# Patient Record
Sex: FEMALE | Race: WHITE | Employment: UNEMPLOYED | URBAN - METROPOLITAN AREA
[De-identification: names, ages, dates, MRNs, and addresses within clinical notes are randomized per-mention and may not be internally consistent; named-entity substitution may affect disease eponyms.]

---

## 2017-12-28 ENCOUNTER — APPOINTMENT (OUTPATIENT)
Dept: GENERAL RADIOLOGY | Facility: HOSPITAL | Age: 47
DRG: 189 | End: 2017-12-28
Attending: EMERGENCY MEDICINE
Payer: MEDICARE

## 2017-12-28 ENCOUNTER — HOSPITAL ENCOUNTER (INPATIENT)
Facility: HOSPITAL | Age: 47
LOS: 3 days | Discharge: HOME HEALTH | DRG: 189 | End: 2018-01-01
Attending: EMERGENCY MEDICINE | Admitting: HOSPITALIST
Payer: MEDICARE

## 2017-12-28 DIAGNOSIS — E87.6 HYPOKALEMIA: ICD-10-CM

## 2017-12-28 DIAGNOSIS — T17.908A ASPIRATION OF FOREIGN BODY, INITIAL ENCOUNTER: Primary | ICD-10-CM

## 2017-12-28 LAB
ALBUMIN SERPL-MCNC: 3.7 G/DL (ref 3.5–4.8)
ALP LIVER SERPL-CCNC: 86 U/L (ref 39–100)
ALT SERPL-CCNC: 141 U/L (ref 14–54)
AST SERPL-CCNC: 251 U/L (ref 15–41)
BASOPHILS # BLD AUTO: 0.02 X10(3) UL (ref 0–0.1)
BASOPHILS NFR BLD AUTO: 0.7 %
BILIRUB SERPL-MCNC: 0.3 MG/DL (ref 0.1–2)
BUN BLD-MCNC: 8 MG/DL (ref 8–20)
CALCIUM BLD-MCNC: 8.5 MG/DL (ref 8.3–10.3)
CHLORIDE: 104 MMOL/L (ref 101–111)
CK: 345 IU/L (ref 26–192)
CKMB: 5 NG/ML (ref 0–5)
CO2: 25 MMOL/L (ref 22–32)
CREAT BLD-MCNC: 0.8 MG/DL (ref 0.55–1.02)
EOSINOPHIL # BLD AUTO: 0.14 X10(3) UL (ref 0–0.3)
EOSINOPHIL NFR BLD AUTO: 4.8 %
ERYTHROCYTE [DISTWIDTH] IN BLOOD BY AUTOMATED COUNT: 12.2 % (ref 11.5–16)
GLUCOSE BLD-MCNC: 135 MG/DL (ref 70–99)
HAV IGM SER QL: 2.1 MG/DL (ref 1.7–3)
HCT VFR BLD AUTO: 38.2 % (ref 34–50)
HGB BLD-MCNC: 13.2 G/DL (ref 12–16)
IMMATURE GRANULOCYTE COUNT: 0.02 X10(3) UL (ref 0–1)
IMMATURE GRANULOCYTE RATIO %: 0.7 %
LYMPHOCYTES # BLD AUTO: 0.96 X10(3) UL (ref 0.9–4)
LYMPHOCYTES NFR BLD AUTO: 32.9 %
M PROTEIN MFR SERPL ELPH: 7.8 G/DL (ref 6.1–8.3)
MB INDEX: 1 % (ref ?–4)
MCH RBC QN AUTO: 30.6 PG (ref 27–33.2)
MCHC RBC AUTO-ENTMCNC: 34.6 G/DL (ref 31–37)
MCV RBC AUTO: 88.6 FL (ref 81–100)
MONOCYTES # BLD AUTO: 0.22 X10(3) UL (ref 0.1–0.6)
MONOCYTES NFR BLD AUTO: 7.5 %
NEUTROPHIL ABS PRELIM: 1.56 X10 (3) UL (ref 1.3–6.7)
NEUTROPHILS # BLD AUTO: 1.56 X10(3) UL (ref 1.3–6.7)
NEUTROPHILS NFR BLD AUTO: 53.4 %
PLATELET # BLD AUTO: 204 10(3)UL (ref 150–450)
POTASSIUM SERPL-SCNC: 2.8 MMOL/L (ref 3.6–5.1)
RBC # BLD AUTO: 4.31 X10(6)UL (ref 3.8–5.1)
RED CELL DISTRIBUTION WIDTH-SD: 40.4 FL (ref 35.1–46.3)
SODIUM SERPL-SCNC: 138 MMOL/L (ref 136–144)
WBC # BLD AUTO: 2.9 X10(3) UL (ref 4–13)

## 2017-12-28 PROCEDURE — 71010 XR CHEST AP PORTABLE  (CPT=71010): CPT | Performed by: EMERGENCY MEDICINE

## 2017-12-28 PROCEDURE — 70360 X-RAY EXAM OF NECK: CPT | Performed by: EMERGENCY MEDICINE

## 2017-12-28 PROCEDURE — 99223 1ST HOSP IP/OBS HIGH 75: CPT | Performed by: HOSPITALIST

## 2017-12-28 RX ORDER — ZOLPIDEM TARTRATE 10 MG/1
10 TABLET ORAL NIGHTLY PRN
COMMUNITY

## 2017-12-28 RX ORDER — SODIUM CHLORIDE 9 MG/ML
125 INJECTION, SOLUTION INTRAVENOUS CONTINUOUS
Status: DISCONTINUED | OUTPATIENT
Start: 2017-12-28 | End: 2017-12-28

## 2017-12-28 RX ORDER — HYDROMORPHONE HYDROCHLORIDE 1 MG/ML
0.5 INJECTION, SOLUTION INTRAMUSCULAR; INTRAVENOUS; SUBCUTANEOUS
Status: DISCONTINUED | OUTPATIENT
Start: 2017-12-28 | End: 2017-12-29

## 2017-12-28 RX ORDER — LEVOTHYROXINE SODIUM 88 UG/1
88 TABLET ORAL
COMMUNITY

## 2017-12-28 RX ORDER — HYDROCODONE BITARTRATE AND ACETAMINOPHEN 10; 325 MG/1; MG/1
1 TABLET ORAL EVERY 6 HOURS PRN
COMMUNITY

## 2017-12-28 RX ORDER — SODIUM CHLORIDE 9 MG/ML
INJECTION, SOLUTION INTRAVENOUS CONTINUOUS
Status: ACTIVE | OUTPATIENT
Start: 2017-12-28 | End: 2017-12-28

## 2017-12-28 RX ORDER — ONDANSETRON 2 MG/ML
4 INJECTION INTRAMUSCULAR; INTRAVENOUS EVERY 4 HOURS PRN
Status: DISCONTINUED | OUTPATIENT
Start: 2017-12-28 | End: 2017-12-28

## 2017-12-28 RX ORDER — FLUOXETINE HYDROCHLORIDE 20 MG/5ML
LIQUID ORAL DAILY
COMMUNITY

## 2017-12-28 RX ORDER — SODIUM CHLORIDE 9 MG/ML
INJECTION, SOLUTION INTRAVENOUS CONTINUOUS
Status: DISCONTINUED | OUTPATIENT
Start: 2017-12-28 | End: 2017-12-29

## 2017-12-28 RX ORDER — KETOROLAC TROMETHAMINE 30 MG/ML
15 INJECTION, SOLUTION INTRAMUSCULAR; INTRAVENOUS EVERY 6 HOURS PRN
Status: DISCONTINUED | OUTPATIENT
Start: 2017-12-28 | End: 2017-12-28

## 2017-12-28 RX ORDER — LORAZEPAM 2 MG/ML
0.5 INJECTION INTRAMUSCULAR EVERY 6 HOURS PRN
Status: DISCONTINUED | OUTPATIENT
Start: 2017-12-28 | End: 2017-12-29

## 2017-12-28 RX ORDER — KETOROLAC TROMETHAMINE 30 MG/ML
30 INJECTION, SOLUTION INTRAMUSCULAR; INTRAVENOUS EVERY 6 HOURS PRN
Status: DISCONTINUED | OUTPATIENT
Start: 2017-12-28 | End: 2017-12-28

## 2017-12-28 RX ORDER — ONDANSETRON 2 MG/ML
4 INJECTION INTRAMUSCULAR; INTRAVENOUS EVERY 6 HOURS PRN
Status: DISCONTINUED | OUTPATIENT
Start: 2017-12-28 | End: 2018-01-01

## 2017-12-28 RX ORDER — CLONAZEPAM 1 MG/1
1 TABLET ORAL 3 TIMES DAILY PRN
COMMUNITY

## 2017-12-29 ENCOUNTER — APPOINTMENT (OUTPATIENT)
Dept: CT IMAGING | Facility: HOSPITAL | Age: 47
DRG: 189 | End: 2017-12-29
Attending: NURSE PRACTITIONER
Payer: MEDICARE

## 2017-12-29 ENCOUNTER — APPOINTMENT (OUTPATIENT)
Dept: GENERAL RADIOLOGY | Facility: HOSPITAL | Age: 47
DRG: 189 | End: 2017-12-29
Attending: INTERNAL MEDICINE
Payer: MEDICARE

## 2017-12-29 PROBLEM — J96.01 ACUTE RESPIRATORY FAILURE WITH HYPOXEMIA (HCC): Status: ACTIVE | Noted: 2017-12-29

## 2017-12-29 LAB
ALBUMIN SERPL-MCNC: 3.2 G/DL (ref 3.5–4.8)
ALLENS TEST: POSITIVE
ALP LIVER SERPL-CCNC: 74 U/L (ref 39–100)
ALT SERPL-CCNC: 129 U/L (ref 14–54)
ARTERIAL BLD GAS O2 SATURATION: 98 % (ref 92–100)
ARTERIAL BLOOD GAS BASE EXCESS: -1.3
ARTERIAL BLOOD GAS HCO3: 23.7 MEQ/L (ref 22–26)
ARTERIAL BLOOD GAS PCO2: 41 MM HG (ref 35–45)
ARTERIAL BLOOD GAS PH: 7.38 (ref 7.35–7.45)
ARTERIAL BLOOD GAS PO2: 263 MM HG (ref 80–105)
AST SERPL-CCNC: 191 U/L (ref 15–41)
ATRIAL RATE: 116 BPM
ATRIAL RATE: 95 BPM
BASOPHILS # BLD AUTO: 0.02 X10(3) UL (ref 0–0.1)
BASOPHILS NFR BLD AUTO: 0.2 %
BILIRUB SERPL-MCNC: 0.4 MG/DL (ref 0.1–2)
BUN BLD-MCNC: 7 MG/DL (ref 8–20)
CALCIUM BLD-MCNC: 8.1 MG/DL (ref 8.3–10.3)
CALCULATED O2 SATURATION: 100 % (ref 92–100)
CARBOXYHEMOGLOBIN: 0.7 % SAT (ref 0–3)
CHLORIDE: 108 MMOL/L (ref 101–111)
CO2: 23 MMOL/L (ref 22–32)
CREAT BLD-MCNC: 0.62 MG/DL (ref 0.55–1.02)
D-DIMER: 0.86 UG/ML FEU (ref 0–0.49)
EOSINOPHIL # BLD AUTO: 0.03 X10(3) UL (ref 0–0.3)
EOSINOPHIL NFR BLD AUTO: 0.3 %
ERYTHROCYTE [DISTWIDTH] IN BLOOD BY AUTOMATED COUNT: 12.3 % (ref 11.5–16)
GLUCOSE BLD-MCNC: 123 MG/DL (ref 70–99)
HAV IGM SER QL: NONREACTIVE
HBV CORE IGM SER QL: NONREACTIVE
HBV SURFACE AG SERPL QL IA: NONREACTIVE
HCT VFR BLD AUTO: 37.6 % (ref 34–50)
HEPATITIS C VIRUS AB INTERPRETATION: NONREACTIVE
HGB BLD-MCNC: 12.6 G/DL (ref 12–16)
IMMATURE GRANULOCYTE COUNT: 0.03 X10(3) UL (ref 0–1)
IMMATURE GRANULOCYTE RATIO %: 0.3 %
L/M: 15 L/MIN
LYMPHOCYTES # BLD AUTO: 0.43 X10(3) UL (ref 0.9–4)
LYMPHOCYTES NFR BLD AUTO: 4.7 %
M PROTEIN MFR SERPL ELPH: 6.8 G/DL (ref 6.1–8.3)
MCH RBC QN AUTO: 30.4 PG (ref 27–33.2)
MCHC RBC AUTO-ENTMCNC: 33.5 G/DL (ref 31–37)
MCV RBC AUTO: 90.6 FL (ref 81–100)
METHEMOGLOBIN: 0.7 % SAT (ref 0.4–1.5)
MONOCYTES # BLD AUTO: 0.38 X10(3) UL (ref 0.1–0.6)
MONOCYTES NFR BLD AUTO: 4.2 %
NEUTROPHIL ABS PRELIM: 8.22 X10 (3) UL (ref 1.3–6.7)
NEUTROPHILS # BLD AUTO: 8.22 X10(3) UL (ref 1.3–6.7)
NEUTROPHILS NFR BLD AUTO: 90.3 %
P AXIS: 54 DEGREES
P AXIS: 57 DEGREES
P-R INTERVAL: 130 MS
P-R INTERVAL: 140 MS
PATIENT TEMPERATURE: 98.6 F
PLATELET # BLD AUTO: 211 10(3)UL (ref 150–450)
POTASSIUM SERPL-SCNC: 3.8 MMOL/L (ref 3.6–5.1)
Q-T INTERVAL: 344 MS
Q-T INTERVAL: 384 MS
QRS DURATION: 84 MS
QRS DURATION: 96 MS
QTC CALCULATION (BEZET): 478 MS
QTC CALCULATION (BEZET): 482 MS
R AXIS: 52 DEGREES
R AXIS: 52 DEGREES
RBC # BLD AUTO: 4.15 X10(6)UL (ref 3.8–5.1)
RED CELL DISTRIBUTION WIDTH-SD: 40.8 FL (ref 35.1–46.3)
SODIUM SERPL-SCNC: 139 MMOL/L (ref 136–144)
T AXIS: 52 DEGREES
T AXIS: 74 DEGREES
TOTAL HEMOGLOBIN: 12.1 G/DL (ref 11.7–16)
TROPONIN: <0.046 NG/ML (ref ?–0.05)
VENTRICULAR RATE: 116 BPM
VENTRICULAR RATE: 95 BPM
WBC # BLD AUTO: 9.1 X10(3) UL (ref 4–13)

## 2017-12-29 PROCEDURE — 71275 CT ANGIOGRAPHY CHEST: CPT | Performed by: NURSE PRACTITIONER

## 2017-12-29 PROCEDURE — 99233 SBSQ HOSP IP/OBS HIGH 50: CPT | Performed by: HOSPITALIST

## 2017-12-29 PROCEDURE — 71010 XR CHEST AP PORTABLE  (CPT=71010): CPT | Performed by: INTERNAL MEDICINE

## 2017-12-29 PROCEDURE — 99291 CRITICAL CARE FIRST HOUR: CPT | Performed by: INTERNAL MEDICINE

## 2017-12-29 RX ORDER — METHYLPREDNISOLONE SODIUM SUCCINATE 125 MG/2ML
INJECTION, POWDER, LYOPHILIZED, FOR SOLUTION INTRAMUSCULAR; INTRAVENOUS
Status: COMPLETED
Start: 2017-12-29 | End: 2017-12-29

## 2017-12-29 RX ORDER — ENOXAPARIN SODIUM 100 MG/ML
40 INJECTION SUBCUTANEOUS DAILY
Status: DISCONTINUED | OUTPATIENT
Start: 2017-12-29 | End: 2017-12-29

## 2017-12-29 RX ORDER — LORAZEPAM 2 MG/ML
INJECTION INTRAMUSCULAR
Status: COMPLETED
Start: 2017-12-29 | End: 2017-12-29

## 2017-12-29 RX ORDER — DIPHENHYDRAMINE HYDROCHLORIDE 50 MG/ML
25 INJECTION INTRAMUSCULAR; INTRAVENOUS EVERY 6 HOURS PRN
Status: DISCONTINUED | OUTPATIENT
Start: 2017-12-29 | End: 2018-01-01

## 2017-12-29 RX ORDER — HYDROCODONE BITARTRATE AND ACETAMINOPHEN 10; 325 MG/1; MG/1
1 TABLET ORAL EVERY 6 HOURS PRN
Status: DISCONTINUED | OUTPATIENT
Start: 2017-12-29 | End: 2018-01-01

## 2017-12-29 RX ORDER — ENOXAPARIN SODIUM 100 MG/ML
40 INJECTION SUBCUTANEOUS DAILY
Status: DISCONTINUED | OUTPATIENT
Start: 2017-12-29 | End: 2018-01-01

## 2017-12-29 RX ORDER — CLONAZEPAM 0.5 MG/1
1 TABLET ORAL 3 TIMES DAILY PRN
Status: DISCONTINUED | OUTPATIENT
Start: 2017-12-29 | End: 2018-01-01

## 2017-12-29 RX ORDER — SODIUM CHLORIDE 0.9 % (FLUSH) 0.9 %
10 SYRINGE (ML) INJECTION EVERY 12 HOURS
Status: DISCONTINUED | OUTPATIENT
Start: 2017-12-29 | End: 2018-01-01

## 2017-12-29 RX ORDER — FAMOTIDINE 10 MG/ML
20 INJECTION, SOLUTION INTRAVENOUS 2 TIMES DAILY
Status: DISCONTINUED | OUTPATIENT
Start: 2017-12-29 | End: 2018-01-01

## 2017-12-29 RX ORDER — FAMOTIDINE 10 MG/ML
INJECTION, SOLUTION INTRAVENOUS
Status: COMPLETED
Start: 2017-12-29 | End: 2017-12-29

## 2017-12-29 RX ORDER — METHYLPREDNISOLONE SODIUM SUCCINATE 125 MG/2ML
125 INJECTION, POWDER, LYOPHILIZED, FOR SOLUTION INTRAMUSCULAR; INTRAVENOUS ONCE
Status: DISCONTINUED | OUTPATIENT
Start: 2017-12-29 | End: 2018-01-01

## 2017-12-29 RX ORDER — HYDROMORPHONE HYDROCHLORIDE 1 MG/ML
0.2 INJECTION, SOLUTION INTRAMUSCULAR; INTRAVENOUS; SUBCUTANEOUS
Status: DISCONTINUED | OUTPATIENT
Start: 2017-12-29 | End: 2017-12-30

## 2017-12-29 RX ORDER — POTASSIUM CHLORIDE 14.9 MG/ML
20 INJECTION INTRAVENOUS ONCE
Status: DISCONTINUED | OUTPATIENT
Start: 2017-12-29 | End: 2017-12-29

## 2017-12-29 RX ORDER — DIPHENHYDRAMINE HYDROCHLORIDE 50 MG/ML
INJECTION INTRAMUSCULAR; INTRAVENOUS
Status: COMPLETED
Start: 2017-12-29 | End: 2017-12-29

## 2017-12-29 RX ORDER — ENOXAPARIN SODIUM 100 MG/ML
1 INJECTION SUBCUTANEOUS DAILY
Status: DISCONTINUED | OUTPATIENT
Start: 2017-12-30 | End: 2017-12-29

## 2017-12-29 RX ORDER — LORAZEPAM 2 MG/ML
0.5 INJECTION INTRAMUSCULAR EVERY 2 HOUR PRN
Status: DISCONTINUED | OUTPATIENT
Start: 2017-12-29 | End: 2018-01-01

## 2017-12-29 RX ORDER — HYDROMORPHONE HYDROCHLORIDE 1 MG/ML
0.2 INJECTION, SOLUTION INTRAMUSCULAR; INTRAVENOUS; SUBCUTANEOUS ONCE
Status: COMPLETED | OUTPATIENT
Start: 2017-12-29 | End: 2017-12-29

## 2017-12-29 NOTE — ED INITIAL ASSESSMENT (HPI)
Pt choked on a piece of steak at a restaurant, mom states she turned blue and stopped breathing, did cpr on pt, food dislodged

## 2017-12-29 NOTE — RESPIRATORY THERAPY NOTE
RAPID RESPONSE- PT RR 30-32, USING ACCESSORY MUSCLES. VERY ANXIOUS. RN HAD REPORTED A SPO2 OF 84% ON NRBM- ABG WAS DONE. SWITCHED PULSE OX TO LEFT HAND AND SPO2 WERE 100%. PO2  ON ABG. BS UPON ARRIVAL PRESENTED WITH AN INSP/EXP STRIDOR.  BS CLEAR/DIM

## 2017-12-29 NOTE — PROGRESS NOTES
[facility]  RAPID RESPONSE NOTE     Sae Fleeting Patient Status:  Observation    10/9/1970 MRN [id number]   [facility] 3NE-A Attending Winifred Bird MD   Hosp Day # 0 PCP None Pcp     Reason for RRT: hypoxic respiratory

## 2017-12-29 NOTE — PROGRESS NOTES
ICU  Critical Care APN Progress Note    NAME: Travis Sellers - ROOM: [address] [place], [place] [address] - MRN: [id number] - Age: 56year old - :10/9/1970    History Of Present Illness:  Travis Sellers is a 56year old female with PMHx significant for spastic ce Soft, non-tender, bowel sounds active all four quadrants, no masses, no organomegaly  Extremities: Extremities atraumatic, no cyanosis or edema,capillary refill <3 sec, right side arm partially contracted but able to be extended.     Pulses: 2+ and symmetri Pt states she has tolerated Dilaudid and Morphine in the past without problem  3. Anxiety  -Ativan 0.5mg PRN  4.  Hypokalemia - was 2.8, has received some replacement but IV infiltrated, recheck now is 3.8    F/E/N:    NPO until swallow eval completed  Prop

## 2017-12-29 NOTE — PROGRESS NOTES
Pt transferred to ICU from [facility], report taken from [facility]. Upon arrival to ICU pt was on a non-rebreather saturating in the upper 90s. Pt was switched to 5L NC with satisfactory saturations and weaned down as tolerated.  Pt is anxious and very concerned ab

## 2017-12-29 NOTE — H&P
[facility]  History and Physical     Laurence Bunch Patient Status:  Emergency    10/9/1970 MRN [id number]   Location 656 [facility] Attending Karol Duarte MD   Hosp Day # 0 PCP None Pcp     Chief Complaint: affect.     Diagnostic Data:      Labs:  Recent Labs   Lab  12/28/17   1856   WBC  2.9*   HGB  13.2   MCV  88.6   PLT  204.0       Recent Labs   Lab  12/28/17 1856   GLU  135*   BUN  8   CREATSERUM  0.80   CA  8.5   ALB  3.7   NA  138   K  2.8*   CL  104

## 2017-12-29 NOTE — PLAN OF CARE
Impaired Swallowing    • Minimize aspiration risk Progressing        Patient/Family Goals    • Patient/Family Long Term Goal Progressing    • Patient/Family Short Term Goal Progressing          Received pt this AM. A&Ox4.  Slurred words at times; consistent

## 2017-12-29 NOTE — ED NOTES
Nasal trumpet removed. Pt placed on 3L NC O2. Tolerating well. Pt reports feeling better. Pt incontinent of urine and stool. Pt cleaned up and changed. IVF running.  A&A. VSS

## 2017-12-29 NOTE — ED NOTES
Report called to Myranda Cornelius, RN on floor pt transported on cart to 3611 via transport.  Pt stable at time of transfer

## 2017-12-29 NOTE — PROGRESS NOTES
[facility]  Progress Note     Johana De Jesus Patient Status:  Inpatient    10/9/1970 MRN [id number]   [facility] 4SW-A Attending Adonis Singer MD   Hosp Day # 0 PCP None Pcp     Chief Complaint: dyspnea    S: Patient has aspiration events  1. Swallow eval  2. NPO currently  3. Cerebral Palsy  1. Baseline independent  4. Transaminase elevation  1. Monitor  2. Likely reactive  3. Hep panel  5. Hypokalemia  1. Replace and monitor    Plan of care: as above. CTA.  If stable sheppard

## 2017-12-29 NOTE — PROGRESS NOTES
0332   Pt. De-sat, NC up to 10L, desat to 84. Non-rebreather placed. Charge notified. Dr. Berniece Alcon called-he placed orders. Dr. Dona Smith to room-Per his order, call RR.   Pt. Transferred to ICU room 473  Report given to Tavo Loja, [facility]  Mother, Valeri Cherry,

## 2017-12-29 NOTE — ED PROVIDER NOTES
Patient Seen in: [facility] Emergency Department    History   Patient presents with:  FB in Throat (GI, respiratory)    Stated Complaint: fb at restraunt    HPI    This is a 56-.   The patient on choked on a piece of steak year-old female who has a h arousable. There is some coarse upper airway sounds that are seem to be getting better at this present time. There is no obvious foreign body in the mouth itself. The patient is in no respiratory distress.  The patient is not septic or toxic    HEENT: At DIFFERENTIAL[[id number]]          Abnormal            Final result                 Please view results for these tests on the individual orders.    COMP METABOLIC PANEL (14)   CBC WITH DIFFERENTIAL WITH PLATELET   RAINBOW DRAW BLUE   RAINBOW DRAW GOLD heart size and vascularity are normal.  Lung fields are clear. No infiltrate, pulmonary edema or pleural effusion. There is no pneumothorax. No radiopaque foreign body identified. CONCLUSION:  No acute intrathoracic process identified.     Dictated

## 2017-12-30 ENCOUNTER — APPOINTMENT (OUTPATIENT)
Dept: CT IMAGING | Facility: HOSPITAL | Age: 47
DRG: 189 | End: 2017-12-30
Attending: HOSPITALIST
Payer: MEDICARE

## 2017-12-30 LAB
ALBUMIN SERPL-MCNC: 2.5 G/DL (ref 3.5–4.8)
ALP LIVER SERPL-CCNC: 58 U/L (ref 39–100)
ALT SERPL-CCNC: 71 U/L (ref 14–54)
AST SERPL-CCNC: 46 U/L (ref 15–41)
BILIRUB SERPL-MCNC: 0.5 MG/DL (ref 0.1–2)
BUN BLD-MCNC: 9 MG/DL (ref 8–20)
CALCIUM BLD-MCNC: 8.4 MG/DL (ref 8.3–10.3)
CHLORIDE: 103 MMOL/L (ref 101–111)
CO2: 26 MMOL/L (ref 22–32)
CREAT BLD-MCNC: 0.56 MG/DL (ref 0.55–1.02)
ERYTHROCYTE [DISTWIDTH] IN BLOOD BY AUTOMATED COUNT: 12.6 % (ref 11.5–16)
GLUCOSE BLD-MCNC: 122 MG/DL (ref 70–99)
HAV IGM SER QL: 1.7 MG/DL (ref 1.7–3)
HCT VFR BLD AUTO: 31.9 % (ref 34–50)
HGB BLD-MCNC: 10.9 G/DL (ref 12–16)
LACTIC ACID: 1.2 MMOL/L (ref 0.5–2)
LACTIC ACID: 1.3 MMOL/L (ref 0.5–2)
LACTIC ACID: 1.8 MMOL/L (ref 0.5–2)
M PROTEIN MFR SERPL ELPH: 6.4 G/DL (ref 6.1–8.3)
MCH RBC QN AUTO: 29.9 PG (ref 27–33.2)
MCHC RBC AUTO-ENTMCNC: 34.2 G/DL (ref 31–37)
MCV RBC AUTO: 87.6 FL (ref 81–100)
PLATELET # BLD AUTO: 191 10(3)UL (ref 150–450)
POTASSIUM SERPL-SCNC: 3 MMOL/L (ref 3.6–5.1)
PROCALCITONIN SERPL-MCNC: <0.11 NG/ML (ref ?–0.11)
RBC # BLD AUTO: 3.64 X10(6)UL (ref 3.8–5.1)
RED CELL DISTRIBUTION WIDTH-SD: 41.1 FL (ref 35.1–46.3)
SODIUM SERPL-SCNC: 137 MMOL/L (ref 136–144)
WBC # BLD AUTO: 11.9 X10(3) UL (ref 4–13)

## 2017-12-30 PROCEDURE — 72125 CT NECK SPINE W/O DYE: CPT | Performed by: HOSPITALIST

## 2017-12-30 PROCEDURE — 99233 SBSQ HOSP IP/OBS HIGH 50: CPT | Performed by: HOSPITALIST

## 2017-12-30 PROCEDURE — 05H633Z INSERTION OF INFUSION DEVICE INTO LEFT SUBCLAVIAN VEIN, PERCUTANEOUS APPROACH: ICD-10-PCS | Performed by: HOSPITALIST

## 2017-12-30 RX ORDER — ACETAMINOPHEN 325 MG/1
650 TABLET ORAL EVERY 6 HOURS PRN
Status: DISCONTINUED | OUTPATIENT
Start: 2017-12-30 | End: 2017-12-31

## 2017-12-30 RX ORDER — SODIUM CHLORIDE 9 MG/ML
INJECTION, SOLUTION INTRAVENOUS ONCE
Status: COMPLETED | OUTPATIENT
Start: 2017-12-30 | End: 2017-12-30

## 2017-12-30 RX ORDER — SODIUM CHLORIDE, SODIUM LACTATE, POTASSIUM CHLORIDE, CALCIUM CHLORIDE 600; 310; 30; 20 MG/100ML; MG/100ML; MG/100ML; MG/100ML
INJECTION, SOLUTION INTRAVENOUS CONTINUOUS
Status: DISCONTINUED | OUTPATIENT
Start: 2017-12-30 | End: 2018-01-01

## 2017-12-30 RX ORDER — ACETAMINOPHEN 325 MG/1
TABLET ORAL
Status: COMPLETED
Start: 2017-12-30 | End: 2017-12-30

## 2017-12-30 NOTE — PROGRESS NOTES
[facility]  Progress Note     Antonio Hand Patient Status:  Inpatient    10/9/1970 MRN [id number]   [facility] 4SW-A Attending Denis Noyola MD   Hosp Day # 1 PCP None Pcp     Chief Complaint: dyspnea    S: Patient has Intravenous Once   • cefTRIAXone  1 g Intravenous Q24H   • azithromycin  500 mg Intravenous Q24H   • MethylPREDNISolone Sodium Succ  125 mg Intravenous Once   • famoTIDine  20 mg Intravenous BID   • Normal Saline Flush  10 mL Intravenous Q12H   • enoxapari

## 2017-12-30 NOTE — PROGRESS NOTES
[facility] Pulmonary/Critical Care Progress Note     SUBJECTIVE/24H Events: All events, procedures, notes reviewed. Transferred out of ICU overnight. Febrile last night as well.  She reports feeling the same today - stil No rashes, ulcers, nodules        Lab Data Review:   Recent Labs   Lab  12/28/17   1856  12/29/17   0246  12/30/17   0615   GLU  135*  123*  122*   BUN  8  7*  9   CREATSERUM  0.80  0.62  0.56   CA  8.5  8.1*  8.4   NA  138  139  137   K  2.8*  3.8  3.0* on 12/28/2017 at 20:03     Approved by: Leroy Albarado MD            Cta Chest For Pulmonary Embolism (cpt=71275)(cs)    Result Date: 12/29/2017  CONCLUSION:  Negative for findings supporting acute pulmonary embolism.   There are airspace patchy opaciti >88%  · Speech following  · Encourage airway clearance measures: deep breath, cough, IS and mobilize/ambulate as able    Jean Holliday, [address] [facility]

## 2017-12-30 NOTE — PLAN OF CARE
Assumed care of pt at 2045. A/O denies c/o resp distress. Dyspnea with exertion, tachypnec, diminished, on RA. Low grade temp, 100.7. Refusing tylenol, aware of swallow study results, prefers not to take PO meds.  Anxious uncomfortable, C/O generalized pain

## 2017-12-30 NOTE — PROGRESS NOTES
Patient requesting additional pain medications over Dilaudid 0.2mg IV q3h prn that is ordered. Pain reported as generalized but also soreness in chest/ribs--likely from chest compressions. Additional one time 0.2mg IV Dilaudid ordered.   Also restarted ho

## 2017-12-30 NOTE — PHYSICAL THERAPY NOTE
Order received for PT evaluation. Attempted evaluation this afternoon, however patient requesting to rest secondary to lethargy and stomach ache. Will re-attempt PT evaluation 12/31/17.

## 2017-12-30 NOTE — SLP NOTE
SPEECH DAILY NOTE - INPATIENT    ASSESSMENT & PLAN   ASSESSMENT  Pt seen for meal assess/dysphagia therapy to monitor po tolerance of recommended diet and ensure adherence to aspiration precautions.   Pt reportedly not hungry and only ordered juice and soda Goal #4 VFSS if csa noted with meals or worsening resp/pulm status DNT         FOLLOW UP  Follow Up Needed: Yes  SLP Follow-up Date: 12/31/17  Number of Visits to Meet Established Goals: 2    Session: 1    If you have any questions, please contact Regine

## 2017-12-31 ENCOUNTER — APPOINTMENT (OUTPATIENT)
Dept: GENERAL RADIOLOGY | Facility: HOSPITAL | Age: 47
DRG: 189 | End: 2017-12-31
Attending: HOSPITALIST
Payer: MEDICARE

## 2017-12-31 LAB
BASOPHILS # BLD AUTO: 0.02 X10(3) UL (ref 0–0.1)
BASOPHILS NFR BLD AUTO: 0.2 %
BUN BLD-MCNC: 11 MG/DL (ref 8–20)
CALCIUM BLD-MCNC: 7.6 MG/DL (ref 8.3–10.3)
CHLORIDE: 108 MMOL/L (ref 101–111)
CO2: 25 MMOL/L (ref 22–32)
CREAT BLD-MCNC: 0.58 MG/DL (ref 0.55–1.02)
EOSINOPHIL # BLD AUTO: 0.24 X10(3) UL (ref 0–0.3)
EOSINOPHIL NFR BLD AUTO: 2.4 %
ERYTHROCYTE [DISTWIDTH] IN BLOOD BY AUTOMATED COUNT: 13.1 % (ref 11.5–16)
GLUCOSE BLD-MCNC: 110 MG/DL (ref 70–99)
GLUCOSE BLD-MCNC: 97 MG/DL (ref 65–99)
HCT VFR BLD AUTO: 29.8 % (ref 34–50)
HGB BLD-MCNC: 9.8 G/DL (ref 12–16)
IMMATURE GRANULOCYTE COUNT: 0.06 X10(3) UL (ref 0–1)
IMMATURE GRANULOCYTE RATIO %: 0.6 %
LYMPHOCYTES # BLD AUTO: 0.77 X10(3) UL (ref 0.9–4)
LYMPHOCYTES NFR BLD AUTO: 7.6 %
MCH RBC QN AUTO: 30.3 PG (ref 27–33.2)
MCHC RBC AUTO-ENTMCNC: 32.9 G/DL (ref 31–37)
MCV RBC AUTO: 92.3 FL (ref 81–100)
MONOCYTES # BLD AUTO: 0.65 X10(3) UL (ref 0.1–0.6)
MONOCYTES NFR BLD AUTO: 6.4 %
NEUTROPHIL ABS PRELIM: 8.42 X10 (3) UL (ref 1.3–6.7)
NEUTROPHILS # BLD AUTO: 8.42 X10(3) UL (ref 1.3–6.7)
NEUTROPHILS NFR BLD AUTO: 82.8 %
PLATELET # BLD AUTO: 145 10(3)UL (ref 150–450)
POTASSIUM SERPL-SCNC: 3.6 MMOL/L (ref 3.6–5.1)
RBC # BLD AUTO: 3.23 X10(6)UL (ref 3.8–5.1)
RED CELL DISTRIBUTION WIDTH-SD: 44.4 FL (ref 35.1–46.3)
SODIUM SERPL-SCNC: 138 MMOL/L (ref 136–144)
WBC # BLD AUTO: 10.2 X10(3) UL (ref 4–13)

## 2017-12-31 PROCEDURE — 71020 XR CHEST PA + LAT CHEST (CPT=71020): CPT | Performed by: HOSPITALIST

## 2017-12-31 PROCEDURE — 99232 SBSQ HOSP IP/OBS MODERATE 35: CPT | Performed by: HOSPITALIST

## 2017-12-31 RX ORDER — ACETAMINOPHEN 500 MG
1000 TABLET ORAL EVERY 6 HOURS PRN
Status: DISCONTINUED | OUTPATIENT
Start: 2017-12-31 | End: 2018-01-01

## 2017-12-31 RX ORDER — CYCLOBENZAPRINE HCL 5 MG
5 TABLET ORAL 3 TIMES DAILY PRN
Status: DISCONTINUED | OUTPATIENT
Start: 2017-12-31 | End: 2017-12-31

## 2017-12-31 RX ORDER — TIZANIDINE 4 MG/1
4 TABLET ORAL EVERY 6 HOURS PRN
Status: DISCONTINUED | OUTPATIENT
Start: 2017-12-31 | End: 2018-01-01

## 2017-12-31 RX ORDER — CYCLOBENZAPRINE HCL 10 MG
10 TABLET ORAL 3 TIMES DAILY PRN
Status: DISCONTINUED | OUTPATIENT
Start: 2017-12-31 | End: 2017-12-31

## 2017-12-31 NOTE — PROGRESS NOTES
[facility]  Progress Note     Jocy Kern Patient Status:  Inpatient    10/9/1970 MRN [id number]   [facility] 4SW-A Attending Cole Boyce MD   Hosp Day # 2 PCP None Pcp     Chief Complaint: dyspnea    S: Patient repr Imaging data reviewed in Epic.     Medications:   • potassium chloride 40mEq IVPB (peripheral line)  40 mEq Intravenous Once   • cefTRIAXone  1 g Intravenous Q24H   • azithromycin  500 mg Intravenous Q24H   • mupirocin  1 Application Each Nare BID   • Methy

## 2017-12-31 NOTE — SLP NOTE
SPEECH DAILY NOTE - INPATIENT    ASSESSMENT & PLAN   ASSESSMENT  Pt seen for meal assess/dysphagia therapy to monitor po tolerance of recommended diet and ensure adherence to aspiration precautions. Pt seen after PT/OT session.   Pt sitting upright in consuelo 1-2 session(s). Trials upgraded solids as clinically indicated with upgrade as appropriate.  In progress   Goal #2 The patient/family/caregiver will demonstrate understanding and implementation of aspiration precautions and swallow strategies independently

## 2017-12-31 NOTE — OCCUPATIONAL THERAPY NOTE
OCCUPATIONAL THERAPY EVALUATION - INPATIENT     Room Number: 406/406-A  Evaluation Date: 12/31/2017  Type of Evaluation: Initial  Presenting Problem: aspiration    Physician Order: IP Consult to Occupational Therapy  Reason for Therapy: ADL/IADL Dysfunctio Unable to rate  Location: chest, from CPR compressions  Management Techniques: Relaxation    COGNITION  Orientation Level:  oriented x4  Following Commands:  follows one step commands consistently  Awareness of Errors:  good awareness of errors made    VIS balance. Simulates LB ADL mod (A), UB ADL min (A). BUE edema noted, RUE tone. Sit to stand min (A). Min (A) ambulation via RW with assist to navigate walker and cueing for RW positioning in relation to body. Chair transfer min (A). Mod (A) turning via RW. occupational profile, detailed assessments, several treatment options    Overall Complexity MODERATE     OT Discharge Recommendations: Home with home health PT/OT  OT Device Recommendations:  (tub clamp)    PLAN  OT Treatment Plan: Balance activities; ADL t

## 2017-12-31 NOTE — PLAN OF CARE
Patient hypotensive during shift bp 83/40, received 2 boluses during shift, and bp increased at 1731 to 120/66 dr Mercy Ely notifed of lactic levels during shift, and notified of persistant heart rate in 120's.  Md notified that patient was complaining of a h

## 2017-12-31 NOTE — PHYSICAL THERAPY NOTE
PHYSICAL THERAPY EVALUATION - INPATIENT     Room Number: 406/406-A  Evaluation Date: 12/31/2017  Type of Evaluation: Initial  Physician Order: PT Eval and Treat    Presenting Problem: sepsis 2/2 aspiration event  Reason for Therapy: Mobility Dysfunctio Standard fall risk    WEIGHT BEARING RESTRICTION                   PAIN ASSESSMENT  Rating: Unable to rate  Location: chest  Management Techniques: Activity promotion; Body mechanics;Breathing techniques;Relaxation;Repositioning    COGNITION  · Memory:  hector A)  Distance (ft): 100  Assistive Device: Rolling walker  Pattern: R Foot drag (too far behind and to the R of walker at times.)          Skilled Therapy Provided: Pt received supine in bed, agreeable to PT.   AROM performed of BLEs 2/2 reports of spasms in inpatient PT to address the above deficits to assist patient in returning to prior to level of function. Rec return to parents' home with HHPT/OT.   Pt will likely require intermittent assist from parents which pt is comfortable with and mother, present fo

## 2017-12-31 NOTE — PROGRESS NOTES
[facility] Progress [facility]     SUBJECTIVE/24H Events: All events, procedures, notes reviewed. Febrile yesterday. She reports doing well today. Denies dyspnea, does endorse occasional cough.  Still h MCH  30.6  30.4  29.9  30.3   MCHC  34.6  33.5  34.2  32.9   RDW  12.2  12.3  12.6  13.1   NEPRELIM  1.56  8.22*   --   8.42*   WBC  2.9*  9.1  11.9  10.2   PLT  204.0  211.0  191.0  145.0*     No results for input(s): BNP in the last 72 hours.   Recent L treatment is recommended to document resolution.     Dictated by: Noble Lee MD on 12/30/2017 at 19:50     Approved by: Noble Lee MD            Xr Chest Ap Portable  (cpt=71010)    Result Date: 12/29/2017  CONCLUSION:  Minimal peribronchial thi resolution making allergic/anaphylactic episode unlikely  · Hypokalemia   · transaminitis  - likely due to above, improved  · Cerebral palsy     PLAN  · procalcitonin normal and improvement in respiratory status argues against pneumonia.  Opacities noted on

## 2017-12-31 NOTE — PLAN OF CARE
Impaired Swallowing    • Minimize aspiration risk Progressing        NEUROLOGICAL - ADULT    • Achieves stable or improved neurological status Progressing        RESPIRATORY - ADULT    • Achieves optimal ventilation and oxygenation Progressing          CAR

## 2018-01-01 ENCOUNTER — APPOINTMENT (OUTPATIENT)
Dept: GENERAL RADIOLOGY | Facility: HOSPITAL | Age: 48
DRG: 189 | End: 2018-01-01
Attending: HOSPITALIST
Payer: MEDICARE

## 2018-01-01 VITALS
RESPIRATION RATE: 18 BRPM | TEMPERATURE: 98 F | WEIGHT: 159.69 LBS | HEIGHT: 58 IN | HEART RATE: 89 BPM | SYSTOLIC BLOOD PRESSURE: 116 MMHG | BODY MASS INDEX: 33.52 KG/M2 | OXYGEN SATURATION: 95 % | DIASTOLIC BLOOD PRESSURE: 77 MMHG

## 2018-01-01 LAB
CORTISOL: 9.9 UG/DL
PLATELET # BLD AUTO: 171 10(3)UL (ref 150–450)
POTASSIUM SERPL-SCNC: 3.6 MMOL/L (ref 3.6–5.1)

## 2018-01-01 PROCEDURE — 99239 HOSP IP/OBS DSCHRG MGMT >30: CPT | Performed by: HOSPITALIST

## 2018-01-01 PROCEDURE — 71046 X-RAY EXAM CHEST 2 VIEWS: CPT | Performed by: HOSPITALIST

## 2018-01-01 NOTE — PHYSICAL THERAPY NOTE
PHYSICAL THERAPY TREATMENT NOTE - INPATIENT    Room Number: 406/406-A     Session: 1   Number of Visits to Meet Established Goals: 3    Presenting Problem: sepsis 2/2 aspiration event    Problem List  Principal Problem:    Aspiration of foreign body, init patient currently need. ..   -   Moving to and from a bed to a chair (including a wheelchair)?: A Little   -   Need to walk in hospital room?: A Little   -   Climbing 3-5 steps with a railing?: A Little       AM-PAC Score:  Raw Score: 18   PT Approx Degree mobility impairment. At this time, Pt. presents with decreased balance, impaired strength, difficulty with gait/transfers resulting in downgrade of overall functional mobility.   Due to above deficits, Pt will benefit from continued IP PT, so that patient

## 2018-01-01 NOTE — CM/SW NOTE
Met with pt who is a 55 y/o woman with history of cerebral palsy currently admitted s/p aspiration event. Pt normally lives alone in her own apartment home in [place], [place]. She has been visiting her parents for the [place] holiday.   Pt plans to DC to her

## 2018-01-01 NOTE — PROGRESS NOTES
Patient alert and oriented X4. Delayed response and slightly mumbled speech as her baseline due to  Hx. Cerebral Palsy. On Aspiration precautions due to Hx of choking. PO medications crushed with apple sauce.  Patient no longer having respiratory distress

## 2018-01-01 NOTE — PROGRESS NOTES
[facility]  Progress Note     Casey Guerrero Patient Status:  Inpatient    10/9/1970 MRN [id number]   [facility] 4SW-A Attending Ramon Borja MD   Hosp Day # 3 PCP None Pcp     Chief Complaint: dyspnea    S: Patient has 40 mg Subcutaneous Daily       ASSESSMENT / PLAN:     1. Acute Hypoxic Resp failure 2/2 choking/aspiration pna  1. Resolved, on RA now  2. Sepsis  1. Ruled out  3. Aspiration Pneumonitis  1. Stop ABX  4. Hypotension  1. Wean IVF  2. Check cortisol  5.  Tach

## 2018-01-01 NOTE — PLAN OF CARE
Patient sinus rhythm,sinus tachycardia rate 99, tolerated meds with applesauce, midline flushes well.  Left message with Dr Kimberly Ro in regards to oral temperature of 100.8 and then decreased to 100.2  Obtained new orders to increase dosage of prn tylenol, bp

## 2018-01-01 NOTE — PROGRESS NOTES
[facility] Pulmonary/Critical Care Progress Note     SUBJECTIVE/24H Events: All events, procedures, notes reviewed. Febrile overnight. She denies any new complaints, still with left sided chest pain.   Denies dyspnea and 31.9*  29.8*   --    MCV  87.6  92.3   --    MCH  29.9  30.3   --    MCHC  34.2  32.9   --    RDW  12.6  13.1   --    NEPRELIM   --   8.42*   --    WBC  11.9  10.2   --    PLT  191.0  145.0*  171.0     No results for input(s): BNP in the last 72 hours.   No mL Intravenous Q12H   • enoxaparin  40 mg Subcutaneous Daily     acetaminophen, HYDROcodone-acetaminophen, ClonazePAM, ondansetron HCl    ASSESSMENT   · Acute hypoxic respiratory failure - improved, likely due to choking episode/ aspiration; acute worsenin

## 2018-01-02 NOTE — PLAN OF CARE
Patient mother arrived on unit and reviewed discharge instructions, medications and follow up care with patient and mother.  Blanca

## 2018-01-02 NOTE — DISCHARGE SUMMARY
[facility]  DISCHARGE SUMMARY     Bo Sewell Patient Status:  Inpatient    10/9/1970 MRN [id number]   [facility] 4NW-A Attending No att. providers found   Hosp Day # 3 PCP None Pcp     Date of Admission: 2017  Anil seen by speech therapy. She did pass her swallow evaluation. During the patient's hospital stay she had fevers and infiltration a chest x-ray. There was some thoughts of possible aspiration pneumonia and she was started on antibiotics.   Aspiration pneum 0           Where to Get Your Medications      Please  your prescriptions at the location directed by your doctor or nurse    Bring a paper prescription for each of these medications  · mupirocin 2% Oint         Follow-up appointment: Pcp, None  Nap

## 2020-12-14 NOTE — SLP NOTE
ADULT SWALLOWING EVALUATION    ASSESSMENT    ASSESSMENT/OVERALL IMPRESSION:  B/S swallow eval completed upon receipt of MD order.   Per MD note History of Present Illness: Mel Jarquin is a 56year old female with cerebral palsy who was brought t rate;Small bites and sips  Aspiration Precautions: Upright position; Slow rate;Small bites and sips; No straw  Medication Administration Recommendations: One pill at a time  Treatment Plan/Recommendations: Dysphagia therapy  Discharge Recommendations/Plan: U Impaired  Bolus Propulsion: Impaired  Mastication: Impaired  Retention: Impaired    Pharyngeal Phase of Swallow: Within Functional Limits  (Please note: Silent aspiration cannot be evaluated clinically.  Videofluoroscopic Swallow Study is required to rule-o alert

## 2021-04-13 NOTE — CONSULTS
[facility]  Pulmonary/Critical Care Consult Note  [facility]  Report of Consultation    Bo Sewell Patient Status:  Inpatient    10/9/1970 MRN [id number]   [facility] 4SW-A Attending Bianca Randall spastic ([facility] [address]    • Depression    • Disorder of thyroid    • Muscle weakness    • Visual impairment      History reviewed. No pertinent surgical history. No family history on file. reports that she has never smoked.  She has never used smokeless tobacco. hrs:   BP Temp Temp src Pulse Resp SpO2 Height Weight   12/29/17 0700 122/79 - - 104 18 95 % - -   12/29/17 0600 136/90 - - 100 24 95 % - -   12/29/17 0400 134/92 - - 109 24 100 % - -   12/29/17 0335 - 100.1 °F (37.8 °C) - - - - - -   12/29/17 6025 - - - - the last 72 hours. Recent Labs   Lab  12/28/17   1904  12/29/17   0246   TROP   --   <0.046   CK  345*   --      No results for input(s): PT, INR, PTT in the last 72 hours.     Other Labs:     ABG:     Recent Labs   Lab  12/29/17   0234   ABGPHT  7.38   AB palsy    PLAN    · Await CT chest may help to exclude PE but also to evaluate parenchyma and ensure no airway debris given reported choking episode and possible aspiration  · Wean fio2 for sats >89%  · Speech swallow eval; NPO for now  · Monitor respirator show

## 2022-01-20 NOTE — PROGRESS NOTES
Received as a transfer from ICU at 0300, alert and oriented, oriented to unit routine, plan of care continued. Fever abated, now 99.6, still c/o generalized pain , not due for IV pain med, requested ativan instead. 0350:  Sleeping at this time. on unit